# Patient Record
Sex: MALE | Race: WHITE | NOT HISPANIC OR LATINO | URBAN - METROPOLITAN AREA
[De-identification: names, ages, dates, MRNs, and addresses within clinical notes are randomized per-mention and may not be internally consistent; named-entity substitution may affect disease eponyms.]

---

## 2018-10-16 ENCOUNTER — EMERGENCY (EMERGENCY)
Facility: HOSPITAL | Age: 53
LOS: 1 days | Discharge: HOME | End: 2018-10-16
Attending: EMERGENCY MEDICINE
Payer: MEDICARE

## 2018-10-16 VITALS
WEIGHT: 179.9 LBS | RESPIRATION RATE: 18 BRPM | DIASTOLIC BLOOD PRESSURE: 82 MMHG | HEIGHT: 67 IN | TEMPERATURE: 98 F | SYSTOLIC BLOOD PRESSURE: 129 MMHG | OXYGEN SATURATION: 99 % | HEART RATE: 66 BPM

## 2018-10-16 VITALS
HEART RATE: 76 BPM | OXYGEN SATURATION: 95 % | RESPIRATION RATE: 18 BRPM | TEMPERATURE: 98 F | DIASTOLIC BLOOD PRESSURE: 74 MMHG | SYSTOLIC BLOOD PRESSURE: 124 MMHG

## 2018-10-16 LAB
ALBUMIN SERPL ELPH-MCNC: 4.7 G/DL — SIGNIFICANT CHANGE UP (ref 3.3–5)
ALP SERPL-CCNC: 77 U/L — SIGNIFICANT CHANGE UP (ref 40–120)
ALT FLD-CCNC: 27 U/L — SIGNIFICANT CHANGE UP (ref 10–45)
ANION GAP SERPL CALC-SCNC: 12 MMOL/L — SIGNIFICANT CHANGE UP (ref 5–17)
AST SERPL-CCNC: 23 U/L — SIGNIFICANT CHANGE UP (ref 10–40)
BASOPHILS # BLD AUTO: 0.1 K/UL — SIGNIFICANT CHANGE UP (ref 0–0.2)
BASOPHILS NFR BLD AUTO: 1.2 % — SIGNIFICANT CHANGE UP (ref 0–2)
BILIRUB SERPL-MCNC: 0.4 MG/DL — SIGNIFICANT CHANGE UP (ref 0.2–1.2)
BUN SERPL-MCNC: 15 MG/DL — SIGNIFICANT CHANGE UP (ref 7–23)
CALCIUM SERPL-MCNC: 9.4 MG/DL — SIGNIFICANT CHANGE UP (ref 8.4–10.5)
CHLORIDE SERPL-SCNC: 103 MMOL/L — SIGNIFICANT CHANGE UP (ref 96–108)
CO2 SERPL-SCNC: 23 MMOL/L — SIGNIFICANT CHANGE UP (ref 22–31)
CREAT SERPL-MCNC: 0.88 MG/DL — SIGNIFICANT CHANGE UP (ref 0.5–1.3)
D DIMER BLD IA.RAPID-MCNC: <150 NG/ML DDU — SIGNIFICANT CHANGE UP
EOSINOPHIL # BLD AUTO: 0.2 K/UL — SIGNIFICANT CHANGE UP (ref 0–0.5)
EOSINOPHIL NFR BLD AUTO: 3 % — SIGNIFICANT CHANGE UP (ref 0–6)
GLUCOSE SERPL-MCNC: 84 MG/DL — SIGNIFICANT CHANGE UP (ref 70–99)
HCT VFR BLD CALC: 46.2 % — SIGNIFICANT CHANGE UP (ref 39–50)
HGB BLD-MCNC: 15.8 G/DL — SIGNIFICANT CHANGE UP (ref 13–17)
LYMPHOCYTES # BLD AUTO: 3 K/UL — SIGNIFICANT CHANGE UP (ref 1–3.3)
LYMPHOCYTES # BLD AUTO: 47.8 % — HIGH (ref 13–44)
MCHC RBC-ENTMCNC: 32.8 PG — SIGNIFICANT CHANGE UP (ref 27–34)
MCHC RBC-ENTMCNC: 34.3 GM/DL — SIGNIFICANT CHANGE UP (ref 32–36)
MCV RBC AUTO: 95.6 FL — SIGNIFICANT CHANGE UP (ref 80–100)
MONOCYTES # BLD AUTO: 0.5 K/UL — SIGNIFICANT CHANGE UP (ref 0–0.9)
MONOCYTES NFR BLD AUTO: 8.5 % — SIGNIFICANT CHANGE UP (ref 2–14)
NEUTROPHILS # BLD AUTO: 2.4 K/UL — SIGNIFICANT CHANGE UP (ref 1.8–7.4)
NEUTROPHILS NFR BLD AUTO: 39.5 % — LOW (ref 43–77)
PLATELET # BLD AUTO: 230 K/UL — SIGNIFICANT CHANGE UP (ref 150–400)
POTASSIUM SERPL-MCNC: 4.3 MMOL/L — SIGNIFICANT CHANGE UP (ref 3.5–5.3)
POTASSIUM SERPL-SCNC: 4.3 MMOL/L — SIGNIFICANT CHANGE UP (ref 3.5–5.3)
PROT SERPL-MCNC: 7.6 G/DL — SIGNIFICANT CHANGE UP (ref 6–8.3)
RBC # BLD: 4.83 M/UL — SIGNIFICANT CHANGE UP (ref 4.2–5.8)
RBC # FLD: 12.7 % — SIGNIFICANT CHANGE UP (ref 10.3–14.5)
SODIUM SERPL-SCNC: 138 MMOL/L — SIGNIFICANT CHANGE UP (ref 135–145)
TROPONIN T, HIGH SENSITIVITY RESULT: <6 NG/L — SIGNIFICANT CHANGE UP (ref 0–51)
WBC # BLD: 6.2 K/UL — SIGNIFICANT CHANGE UP (ref 3.8–10.5)
WBC # FLD AUTO: 6.2 K/UL — SIGNIFICANT CHANGE UP (ref 3.8–10.5)

## 2018-10-16 PROCEDURE — 84484 ASSAY OF TROPONIN QUANT: CPT

## 2018-10-16 PROCEDURE — 93005 ELECTROCARDIOGRAM TRACING: CPT

## 2018-10-16 PROCEDURE — 93010 ELECTROCARDIOGRAM REPORT: CPT

## 2018-10-16 PROCEDURE — 85379 FIBRIN DEGRADATION QUANT: CPT

## 2018-10-16 PROCEDURE — 99283 EMERGENCY DEPT VISIT LOW MDM: CPT | Mod: 25

## 2018-10-16 PROCEDURE — 80053 COMPREHEN METABOLIC PANEL: CPT

## 2018-10-16 PROCEDURE — 71046 X-RAY EXAM CHEST 2 VIEWS: CPT

## 2018-10-16 PROCEDURE — 71046 X-RAY EXAM CHEST 2 VIEWS: CPT | Mod: 26

## 2018-10-16 PROCEDURE — 99285 EMERGENCY DEPT VISIT HI MDM: CPT | Mod: 25

## 2018-10-16 PROCEDURE — 85027 COMPLETE CBC AUTOMATED: CPT

## 2018-10-16 RX ORDER — IBUPROFEN 200 MG
400 TABLET ORAL ONCE
Qty: 0 | Refills: 0 | Status: COMPLETED | OUTPATIENT
Start: 2018-10-16 | End: 2018-10-16

## 2018-10-16 RX ADMIN — Medication 400 MILLIGRAM(S): at 14:04

## 2018-10-16 NOTE — ED ADULT NURSE NOTE - NSIMPLEMENTINTERV_GEN_ALL_ED
Implemented All Universal Safety Interventions:  [place] to call system. Call bell, personal items and telephone within reach. Instruct patient to call for assistance. Room bathroom lighting operational. Non-slip footwear when patient is off stretcher. Physically safe environment: no spills, clutter or unnecessary equipment. Stretcher in lowest position, wheels locked, appropriate side rails in place.

## 2018-10-16 NOTE — ED ADULT NURSE NOTE - OBJECTIVE STATEMENT
53y Male PMH HIV, htn, hld presents to the ED c/o L. shoulder pain X1 week. Pt states he closed his bedroom window one night and noticed the pain to L. shoulder/ lateral L. side under armpit the next morning. Pt reports sharp constant pain and states it has been relieved by Advil. Pt states the pain has been getting worse and reports intermittent tingling to L. shoulder. Pt denies injury but states he works out regularly.   Denies any fever/chills, recent illness, cough, chest pain, SOB, neck pain, back pain, L elbow/wrist/hand pain, numbness, rash, recent travel, LE pain/swelling, smoking. Last took motrin 5 hours ago. 53y Male PMH HIV, htn, hld presents to the ED c/o L. shoulder pain X1 week. Pt states he closed his bedroom window one night and noticed the pain to L. shoulder/ lateral L. side under armpit the next morning. Pt reports sharp constant pain and states it has been relieved by Advil. Pt states the pain has been getting worse and reports intermittent tingling to L. shoulder. Pt denies injury but states he works out regularly. Pt presents a&oX4, ambulatory, well in appearance, airway intact, breathing spontaneously and unlabored, lung sounds clear bilaterally, no obvious deformity/ ecchymosis/ bleeding noted to L. shoulder area, moving all extremities, L. shoulder non tender to palpation, skin warm dry and intact, cap refill <3 seconds, denies fever/chills, recent illness, CP, SOB, numbness/tingling, rash, recent travel. MD at bedside for mona.

## 2018-10-16 NOTE — ED PROVIDER NOTE - OBJECTIVE STATEMENT
52yo M with PMH HIV (on meds, last viral load undetectable 3 months ago), HTN, HLD, presenting with pain behind left shoulder x 1 week. Pt reports he noticed pain the day after closing his bedroom window in the middle of the night. Reports pain is constant, sharp, 8/10 in severity and worsening since onset. Pain gets better for 6 hours when he takes motrin. Pain is NOT worse with movement or palpation. Pain is NOT pleuritic. Reports occasional associated tingling to left shoulder. Patient works out regularly, no new heavy weights or change in workout. No previous surgeries. Denies any fever/chills, recent illness, cough, chest pain, SOB, neck pain, back pain, L elbow/wrist/hand pain, numbness, rash, recent travel, LE pain/swelling, smoking. Last took motrin 5 hours ago.

## 2018-10-16 NOTE — ED PROVIDER NOTE - EXTREMITY EXAM
full ROM of LUE at shoulder/elbow/wrist/hand with strength 5/5, no ttp at joints or along ribs, no trap ttp, sensation intact, radial pulse 2+, cap refill < 2s/left upper extremity findings full ROM of LUE at shoulder/elbow/wrist/hand with strength 5/5, no ttp at joints or along ribs, no trap ttp, no axillary LAD, sensation intact, radial pulse 2+, cap refill < 2s/left upper extremity findings

## 2018-10-16 NOTE — ED PROVIDER NOTE - PLAN OF CARE
Take Motrin 600mg every 8hrs for pain as needed. Take with food.   May alternate with Tylenol 650mg every 6 hours as needed.   Follow up with your primary care provided upon discharge. Bring copy of printed results with you.   Return to ER for any worsening pain, chest pain, shortness of breath, numbness, tingling, or any other concerning symptoms.

## 2018-10-16 NOTE — ED PROVIDER NOTE - CARE PLAN
Principal Discharge DX:	Musculoskeletal pain  Assessment and plan of treatment:	Take Motrin 600mg every 8hrs for pain as needed. Take with food.   May alternate with Tylenol 650mg every 6 hours as needed.   Follow up with your primary care provided upon discharge. Bring copy of printed results with you.   Return to ER for any worsening pain, chest pain, shortness of breath, numbness, tingling, or any other concerning symptoms.

## 2018-10-16 NOTE — ED PROVIDER NOTE - MEDICAL DECISION MAKING DETAILS
ATTG: : back pain, onset 1 week ago. given pmhx concern for atypical cardiac presentation. will check labs,  High sens trop, xray chest, concern for infection. pain medication, re eval for dispo

## 2018-10-16 NOTE — ED PROVIDER NOTE - MUSCULOSKELETAL NECK EXAM
no deformity, pain or tenderness. no restriction of movement/full ROM intact without pain, no midline ttp

## 2024-11-11 NOTE — ED PROVIDER NOTE - ATTENDING CONTRIBUTION TO CARE
No skilled PT needs 52yo M with PMH HIV (on meds, last viral load undetectable 3 months ago), HTN, HLD, presenting with pain behind left shoulder x 1 week. Pain not worse with movement or breathing. denies any injury. no fever no chills no cough. no heart palp.pain began night after was cold evening and he closed the window to his bedroom.   Better with motrin. No fever/chills, recent illness, cough, chest pain, SOB, neck pain, back pain. no associated ant chest pain. no weakness or numbness. no tearing pain. no heart palp. no diaphoresis.   Gen.  no acute distress  HEENT:  perrl eomi   Lungs:  b/l bs  CVS: S1S2   Abd;  soft non tend  Ext: no edema no erythema  Neuro: aaox 3 no focal deficits  MSK: 5/5 x  4 ext